# Patient Record
Sex: FEMALE | Race: WHITE
[De-identification: names, ages, dates, MRNs, and addresses within clinical notes are randomized per-mention and may not be internally consistent; named-entity substitution may affect disease eponyms.]

---

## 2019-07-11 ENCOUNTER — HOSPITAL ENCOUNTER (OUTPATIENT)
Dept: HOSPITAL 35 - OR | Age: 84
Discharge: HOME | End: 2019-07-11
Attending: OPHTHALMOLOGY
Payer: COMMERCIAL

## 2019-07-11 VITALS — SYSTOLIC BLOOD PRESSURE: 196 MMHG | DIASTOLIC BLOOD PRESSURE: 70 MMHG

## 2019-07-11 VITALS — BODY MASS INDEX: 33.59 KG/M2 | WEIGHT: 209 LBS | HEIGHT: 65.98 IN

## 2019-07-11 DIAGNOSIS — H53.461: ICD-10-CM

## 2019-07-11 DIAGNOSIS — Z82.49: ICD-10-CM

## 2019-07-11 DIAGNOSIS — I50.32: ICD-10-CM

## 2019-07-11 DIAGNOSIS — Z80.0: ICD-10-CM

## 2019-07-11 DIAGNOSIS — G47.30: ICD-10-CM

## 2019-07-11 DIAGNOSIS — I13.0: ICD-10-CM

## 2019-07-11 DIAGNOSIS — M19.90: ICD-10-CM

## 2019-07-11 DIAGNOSIS — Z79.82: ICD-10-CM

## 2019-07-11 DIAGNOSIS — E66.09: ICD-10-CM

## 2019-07-11 DIAGNOSIS — I25.2: ICD-10-CM

## 2019-07-11 DIAGNOSIS — E78.2: ICD-10-CM

## 2019-07-11 DIAGNOSIS — Z85.3: ICD-10-CM

## 2019-07-11 DIAGNOSIS — H53.462: ICD-10-CM

## 2019-07-11 DIAGNOSIS — Z88.6: ICD-10-CM

## 2019-07-11 DIAGNOSIS — N18.3: ICD-10-CM

## 2019-07-11 DIAGNOSIS — Z98.890: ICD-10-CM

## 2019-07-11 DIAGNOSIS — Z95.1: ICD-10-CM

## 2019-07-11 DIAGNOSIS — H02.403: Primary | ICD-10-CM

## 2019-07-11 DIAGNOSIS — Z88.8: ICD-10-CM

## 2019-07-11 DIAGNOSIS — Z79.899: ICD-10-CM

## 2019-07-11 DIAGNOSIS — E03.9: ICD-10-CM

## 2019-07-11 DIAGNOSIS — Z83.3: ICD-10-CM

## 2019-07-11 DIAGNOSIS — Z96.652: ICD-10-CM

## 2019-07-11 PROCEDURE — 56531: CPT

## 2019-07-11 PROCEDURE — 62850: CPT

## 2019-07-11 PROCEDURE — 50101: CPT

## 2019-07-11 PROCEDURE — 56528: CPT

## 2019-07-11 PROCEDURE — 50386 REMOVE STENT VIA TRANSURETH: CPT

## 2019-07-11 PROCEDURE — 50398: CPT

## 2019-07-11 PROCEDURE — 51636: CPT

## 2019-07-11 PROCEDURE — 70005: CPT

## 2019-07-11 PROCEDURE — 62110: CPT

## 2019-07-11 PROCEDURE — 50010 RENAL EXPLORATION: CPT

## 2019-12-05 ENCOUNTER — HOSPITAL ENCOUNTER (OUTPATIENT)
Dept: HOSPITAL 35 - OR | Age: 84
Discharge: HOME | End: 2019-12-05
Attending: OPHTHALMOLOGY
Payer: COMMERCIAL

## 2019-12-05 VITALS — WEIGHT: 200 LBS | BODY MASS INDEX: 34.15 KG/M2 | HEIGHT: 64.02 IN

## 2019-12-05 VITALS — SYSTOLIC BLOOD PRESSURE: 197 MMHG | DIASTOLIC BLOOD PRESSURE: 65 MMHG

## 2019-12-05 DIAGNOSIS — Z85.3: ICD-10-CM

## 2019-12-05 DIAGNOSIS — Z98.890: ICD-10-CM

## 2019-12-05 DIAGNOSIS — H53.461: ICD-10-CM

## 2019-12-05 DIAGNOSIS — E78.5: ICD-10-CM

## 2019-12-05 DIAGNOSIS — Z79.82: ICD-10-CM

## 2019-12-05 DIAGNOSIS — Z96.652: ICD-10-CM

## 2019-12-05 DIAGNOSIS — Z98.41: ICD-10-CM

## 2019-12-05 DIAGNOSIS — I10: ICD-10-CM

## 2019-12-05 DIAGNOSIS — Z95.1: ICD-10-CM

## 2019-12-05 DIAGNOSIS — H02.831: ICD-10-CM

## 2019-12-05 DIAGNOSIS — Z98.42: ICD-10-CM

## 2019-12-05 DIAGNOSIS — Z79.899: ICD-10-CM

## 2019-12-05 DIAGNOSIS — H02.834: Primary | ICD-10-CM

## 2019-12-05 DIAGNOSIS — E03.9: ICD-10-CM

## 2019-12-05 DIAGNOSIS — Z88.6: ICD-10-CM

## 2019-12-05 DIAGNOSIS — G47.30: ICD-10-CM

## 2019-12-05 DIAGNOSIS — H53.462: ICD-10-CM

## 2019-12-05 PROCEDURE — 50398: CPT

## 2019-12-05 PROCEDURE — 62110: CPT

## 2019-12-05 PROCEDURE — 50101: CPT

## 2019-12-05 PROCEDURE — 51636: CPT

## 2019-12-05 PROCEDURE — 70005: CPT

## 2019-12-05 PROCEDURE — 50010 RENAL EXPLORATION: CPT

## 2019-12-05 PROCEDURE — 56531: CPT

## 2019-12-05 PROCEDURE — 62850: CPT

## 2019-12-05 PROCEDURE — 50386 REMOVE STENT VIA TRANSURETH: CPT

## 2019-12-09 NOTE — O
[facility]
[place]   [address]                     OPERATIVE REPORT              
_______________________________________________________________________________
 
Name:       CASSIE MEZA            Room #:                     DEP [facility].#:      [id number]                       Account #:      [id number]  
Admission:  12/05/19    Attend Phys:    Avi Sharma MD  
Discharge:  12/05/19    Date of Birth:  [birth year]  
                                                          Report #: [id number]
                                                                    [id number]   
_______________________________________________________________________________
THIS REPORT FOR:   //name//                          
 
CC: EDDIE Sharma
 
DATE OF SERVICE:  12/05/2019
 
 
SURGEON:  Avi Sharma MD
 
ASSISTANT:  None.
 
PREOPERATIVE DIAGNOSIS:  Bilateral upper lid dermatochalasia with superior
visual field defect.
 
POSTOPERATIVE DIAGNOSIS:  Bilateral upper lid dermatochalasia with superior
visual field defect.
 
OPERATION PERFORMED:  Bilateral upper lid functional blepharoplasty.
 
ANESTHESIA:  Local with IV sedation.
 
COMPLICATIONS:  None.
 
INDICATIONS FOR SURGERY:  This patient has acquired upper lid dermatochalasia
with superior visual field loss both eyes because of excessive upper lid tissues
to include skin and fat.  Visual field testing demonstrates dense superior
visual defects. Retesting with the upper lid elevated shows an improvement in
visual field loss of over 30% and in excess of 12 degrees.  The current
procedures are undertaken in order to improve the patient's visual function.
 
Informed consent was obtained to include but not limited to the loss of vision,
bleeding, infection, scarring, failure to improve the problem and need for
further surgery.
 
DESCRIPTION OF OPERATION:  The patient was taken to the operating room, where 2%
Xylocaine with epinephrine mixed with equal parts of 0.75% Marcaine with Wydase
was administered transcutaneously to each upper lid.  The patient was then
prepped and draped in the usual sterile fashion and a skin-marking pen was then
utilized to outline an upper lid crease that was symmetrical on each side. 
Graefe forceps were then used to quantitate the redundant upper lid skin and it
was similarly outlined.  The incisions were then made with Lboo scissors and
 
 
 
[facility]
[address]
[place]   [address]                     OPERATIVE REPORT              
_______________________________________________________________________________
 
Name:       SUSANACASSIE HONG            Room #:                     DEP [facility]#:      [id number]                       Account #:      [id number]  
Admission:  12/05/19    Attend Phys:    Avi Sharma MD  
Discharge:  12/05/19    Date of Birth:  [birth year]  
                                                          Report #: [id number]
                                                                    [id number]   
_______________________________________________________________________________
a skin-muscle flap removed from each side with high-temp cautery.  Hemostasis
was achieved with the monopolar cautery as it was throughout the case.  The
orbital septum was then identified and the central and medial fat pads were
inspected.  The redundant soft tissue was then sculpted with the monopolar
cautery.  The upper lid crease was then reformed with tightening of the
pretarsal orbicularis muscle.  The upper lid crease was then further reformed
with multiple interrupted 6-0 chromic sutures.  The skin was then closed with a
running 6-0 plain gut suture.  The wound was then cleaned and dressed with
ophthalmic antibiotic ointment and a nonstick dressing.
 
The patient was transported to the recovery area, where cold compresses were
applied, having tolerated the procedure well with no anesthetic or operative
complications being noted.
 
 
 
 
 
 
 
 
 
 
 
 
 
 
 
 
 
 
 
 
 
 
 
 
 
 
 
 
 
 
 
  <ELECTRONICALLY SIGNED>
   By: Avi Sharma MD          
  12/09/19     0615
D: 12/05/19 1157                           _____________________________________
T: 12/05/19 1220                           Avi Sharma MD            /nt